# Patient Record
Sex: FEMALE | Race: WHITE | NOT HISPANIC OR LATINO | Employment: UNEMPLOYED | RURAL
[De-identification: names, ages, dates, MRNs, and addresses within clinical notes are randomized per-mention and may not be internally consistent; named-entity substitution may affect disease eponyms.]

---

## 2020-10-01 ENCOUNTER — TELEPHONE (OUTPATIENT)
Dept: URGENT CARE | Facility: CLINIC | Age: 20
End: 2020-10-01

## 2020-10-01 NOTE — TELEPHONE ENCOUNTER
----- Message from JUAN Bailey sent at 10/1/2020  1:13 PM EDT -----  Please inform pt of negative results and follow 'return to work/school' protocol - minimum of 10 days off work/school since sx appeared, minimum of 24 hours without use of fever reducing medication AND improved sx, surgical mask for 14 days or until sx resolved - whichever is longer -  for patient. Thanks!